# Patient Record
Sex: FEMALE | URBAN - METROPOLITAN AREA
[De-identification: names, ages, dates, MRNs, and addresses within clinical notes are randomized per-mention and may not be internally consistent; named-entity substitution may affect disease eponyms.]

---

## 2020-08-11 ENCOUNTER — OFFICE VISIT (OUTPATIENT)
Dept: URBAN - METROPOLITAN AREA CLINIC 35 | Facility: CLINIC | Age: 50
End: 2020-08-11

## 2020-08-17 ENCOUNTER — OFFICE VISIT (OUTPATIENT)
Dept: URBAN - METROPOLITAN AREA SURGERY CENTER 8 | Facility: SURGERY CENTER | Age: 50
End: 2020-08-17

## 2020-09-01 ENCOUNTER — OFFICE VISIT (OUTPATIENT)
Dept: URBAN - METROPOLITAN AREA CLINIC 31 | Facility: CLINIC | Age: 50
End: 2020-09-01

## 2020-10-20 ENCOUNTER — TELEPHONE ENCOUNTER (OUTPATIENT)
Dept: URBAN - METROPOLITAN AREA CLINIC 13 | Facility: CLINIC | Age: 50
End: 2020-10-20

## 2020-10-20 ENCOUNTER — OFFICE VISIT (OUTPATIENT)
Dept: URBAN - METROPOLITAN AREA CLINIC 31 | Facility: CLINIC | Age: 50
End: 2020-10-20

## 2020-10-20 ENCOUNTER — OFFICE VISIT (OUTPATIENT)
Dept: URBAN - METROPOLITAN AREA CLINIC 35 | Facility: CLINIC | Age: 50
End: 2020-10-20

## 2020-10-20 ENCOUNTER — LAB OUTSIDE AN ENCOUNTER (OUTPATIENT)
Dept: URBAN - METROPOLITAN AREA SURGERY CENTER 8 | Facility: SURGERY CENTER | Age: 50
End: 2020-10-20

## 2020-10-20 ENCOUNTER — TELEPHONE ENCOUNTER (OUTPATIENT)
Dept: URBAN - METROPOLITAN AREA CLINIC 35 | Facility: CLINIC | Age: 50
End: 2020-10-20

## 2020-10-20 VITALS — HEIGHT: 66 IN | WEIGHT: 167 LBS | BODY MASS INDEX: 26.84 KG/M2

## 2020-10-20 RX ORDER — MELOXICAM 15 MG/1
1 TABLET ORAL DAILY
Status: ACTIVE | COMMUNITY

## 2020-10-20 RX ORDER — SODIUM PICOSULFATE, MAGNESIUM OXIDE, AND ANHYDROUS CITRIC ACID 10; 3.5; 12 MG/160ML; G/160ML; G/160ML
AS DIRECTED FOR SPLIT DOSE LIQUID ORAL
Qty: 1 BOX | Refills: 0 | OUTPATIENT
Start: 2020-10-20

## 2020-10-20 RX ORDER — OMEGA-3/DHA/EPA/FISH OIL 120-180 MG
1 CAPSULE ORAL ONCE A DAY
Status: ACTIVE | COMMUNITY

## 2020-10-20 RX ORDER — SODIUM PICOSULFATE, MAGNESIUM OXIDE, AND ANHYDROUS CITRIC ACID 10; 3.5; 12 MG/160ML; G/160ML; G/160ML
AS DIRECTED FOR SPLIT DOSE LIQUID ORAL
Qty: 1 BOX | Refills: 0 | Status: ACTIVE | COMMUNITY
Start: 2020-05-28

## 2020-10-20 NOTE — EXAM-MIGRATED EXAMINATIONS
GENERAL APPEARANCE: - alert, in no acute distress, well developed, well nourished;   ORAL CAVITY: - mucosa moist.  MP 2.;

## 2020-10-20 NOTE — HPI-MIGRATED HPI
;     Colorectal Cancer Screening : Patient presents today for a colorectal cancer screening. She currently admits 2-3 bowel movements per day with normal and formed stools. Patient denies seeing any blood or mucous in her stool. Patient denies melena. Patient denies heartburn. Patient states this is not her first colonoscopy. Patient had a colonoscopy done in 2008 with Dr. Barron. She had one single hyperplastic polyp in the rectum. Patient states that she had two colonoscopies prior to the one in 2008. Colonoscopy report has been scanned into EMR. Patient denies having a history of bleeding disorders or respiratory diseases. She has her prep but states that when she scheduled the procedure the first time, and also when she called and rescheduled her procedure, no one called her to go over any prep instructions and no one mailed her a copy of anything. Patient was assured this would be taken care of and an encounter has been sent to Dr. Barron's , regarding.   ;

## 2020-10-26 ENCOUNTER — OFFICE VISIT (OUTPATIENT)
Dept: URBAN - METROPOLITAN AREA SURGERY CENTER 8 | Facility: SURGERY CENTER | Age: 50
End: 2020-10-26

## 2020-11-10 ENCOUNTER — OFFICE VISIT (OUTPATIENT)
Dept: URBAN - METROPOLITAN AREA CLINIC 31 | Facility: CLINIC | Age: 50
End: 2020-11-10

## 2020-11-10 VITALS — HEIGHT: 66 IN | BODY MASS INDEX: 26.68 KG/M2 | WEIGHT: 166 LBS

## 2020-11-10 RX ORDER — MELOXICAM 15 MG/1
1 TABLET ORAL DAILY
Status: DISCONTINUED | COMMUNITY

## 2020-11-10 RX ORDER — SODIUM PICOSULFATE, MAGNESIUM OXIDE, AND ANHYDROUS CITRIC ACID 10; 3.5; 12 MG/160ML; G/160ML; G/160ML
AS DIRECTED FOR SPLIT DOSE LIQUID ORAL
Qty: 1 BOX | Refills: 0 | Status: DISCONTINUED | COMMUNITY
Start: 2020-10-20

## 2020-11-10 RX ORDER — SODIUM PICOSULFATE, MAGNESIUM OXIDE, AND ANHYDROUS CITRIC ACID 10; 3.5; 12 MG/160ML; G/160ML; G/160ML
AS DIRECTED FOR SPLIT DOSE LIQUID ORAL
Qty: 1 BOX | Refills: 0 | Status: DISCONTINUED | COMMUNITY
Start: 2020-05-28

## 2020-11-10 RX ORDER — OMEGA-3/DHA/EPA/FISH OIL 120-180 MG
1 CAPSULE ORAL ONCE A DAY
Status: ACTIVE | COMMUNITY

## 2020-11-10 NOTE — HPI-MIGRATED HPI
;     Colonoscopy Follow-Up : Patient presents today for a follow-up after the colonoscopy that was done on 10/27/2020. Since the procedure patient states that she is having 2-3 bowel movements per day with normal and formed stools. Patient denies seeing any blood or mucous in her stool. Patient denies melena. Patient states that she had no complications following her colonoscopy.   ;